# Patient Record
Sex: FEMALE | Race: OTHER | NOT HISPANIC OR LATINO
[De-identification: names, ages, dates, MRNs, and addresses within clinical notes are randomized per-mention and may not be internally consistent; named-entity substitution may affect disease eponyms.]

---

## 2021-02-03 PROBLEM — Z00.00 ENCOUNTER FOR PREVENTIVE HEALTH EXAMINATION: Status: ACTIVE | Noted: 2021-02-03

## 2021-02-04 ENCOUNTER — APPOINTMENT (OUTPATIENT)
Dept: GYNECOLOGIC ONCOLOGY | Facility: CLINIC | Age: 71
End: 2021-02-04
Payer: MEDICARE

## 2021-02-04 VITALS
WEIGHT: 138 LBS | HEART RATE: 102 BPM | DIASTOLIC BLOOD PRESSURE: 83 MMHG | HEIGHT: 62 IN | BODY MASS INDEX: 25.4 KG/M2 | SYSTOLIC BLOOD PRESSURE: 169 MMHG

## 2021-02-04 DIAGNOSIS — N95.0 POSTMENOPAUSAL BLEEDING: ICD-10-CM

## 2021-02-04 DIAGNOSIS — R93.89 ABNORMAL FINDINGS ON DIAGNOSTIC IMAGING OF OTHER SPECIFIED BODY STRUCTURES: ICD-10-CM

## 2021-02-04 DIAGNOSIS — Z78.9 OTHER SPECIFIED HEALTH STATUS: ICD-10-CM

## 2021-02-04 PROCEDURE — 58100 BIOPSY OF UTERUS LINING: CPT

## 2021-02-04 PROCEDURE — 99204 OFFICE O/P NEW MOD 45 MIN: CPT | Mod: 25

## 2021-02-04 RX ORDER — AMLODIPINE BESYLATE 5 MG/1
TABLET ORAL
Refills: 0 | Status: ACTIVE | COMMUNITY

## 2021-02-08 LAB — CORE LAB BIOPSY: NORMAL

## 2021-02-09 ENCOUNTER — APPOINTMENT (OUTPATIENT)
Dept: CT IMAGING | Facility: CLINIC | Age: 71
End: 2021-02-09
Payer: MEDICARE

## 2021-02-09 ENCOUNTER — OUTPATIENT (OUTPATIENT)
Dept: OUTPATIENT SERVICES | Facility: HOSPITAL | Age: 71
LOS: 1 days | End: 2021-02-09

## 2021-02-09 PROCEDURE — 74177 CT ABD & PELVIS W/CONTRAST: CPT | Mod: 26,ME

## 2021-02-09 PROCEDURE — G1004: CPT

## 2021-02-19 ENCOUNTER — OUTPATIENT (OUTPATIENT)
Dept: OUTPATIENT SERVICES | Facility: HOSPITAL | Age: 71
LOS: 1 days | End: 2021-02-19

## 2021-02-19 VITALS
HEART RATE: 98 BPM | HEIGHT: 59 IN | WEIGHT: 139.99 LBS | OXYGEN SATURATION: 97 % | DIASTOLIC BLOOD PRESSURE: 80 MMHG | SYSTOLIC BLOOD PRESSURE: 124 MMHG | TEMPERATURE: 98 F | RESPIRATION RATE: 16 BRPM

## 2021-02-19 DIAGNOSIS — N63.10 UNSPECIFIED LUMP IN THE RIGHT BREAST, UNSPECIFIED QUADRANT: Chronic | ICD-10-CM

## 2021-02-19 DIAGNOSIS — I10 ESSENTIAL (PRIMARY) HYPERTENSION: ICD-10-CM

## 2021-02-19 DIAGNOSIS — C54.1 MALIGNANT NEOPLASM OF ENDOMETRIUM: ICD-10-CM

## 2021-02-19 LAB
ANION GAP SERPL CALC-SCNC: 13 MMOL/L — SIGNIFICANT CHANGE UP (ref 7–14)
BLD GP AB SCN SERPL QL: NEGATIVE — SIGNIFICANT CHANGE UP
BUN SERPL-MCNC: 13 MG/DL — SIGNIFICANT CHANGE UP (ref 7–23)
CALCIUM SERPL-MCNC: 9.3 MG/DL — SIGNIFICANT CHANGE UP (ref 8.4–10.5)
CHLORIDE SERPL-SCNC: 101 MMOL/L — SIGNIFICANT CHANGE UP (ref 98–107)
CO2 SERPL-SCNC: 27 MMOL/L — SIGNIFICANT CHANGE UP (ref 22–31)
CREAT SERPL-MCNC: 0.55 MG/DL — SIGNIFICANT CHANGE UP (ref 0.5–1.3)
GLUCOSE SERPL-MCNC: 93 MG/DL — SIGNIFICANT CHANGE UP (ref 70–99)
HCT VFR BLD CALC: 42.5 % — SIGNIFICANT CHANGE UP (ref 34.5–45)
HGB BLD-MCNC: 12.9 G/DL — SIGNIFICANT CHANGE UP (ref 11.5–15.5)
MCHC RBC-ENTMCNC: 24.7 PG — LOW (ref 27–34)
MCHC RBC-ENTMCNC: 30.4 GM/DL — LOW (ref 32–36)
MCV RBC AUTO: 81.3 FL — SIGNIFICANT CHANGE UP (ref 80–100)
NRBC # BLD: 0 /100 WBCS — SIGNIFICANT CHANGE UP
NRBC # FLD: 0 K/UL — SIGNIFICANT CHANGE UP
PLATELET # BLD AUTO: 307 K/UL — SIGNIFICANT CHANGE UP (ref 150–400)
POTASSIUM SERPL-MCNC: 4.2 MMOL/L — SIGNIFICANT CHANGE UP (ref 3.5–5.3)
POTASSIUM SERPL-SCNC: 4.2 MMOL/L — SIGNIFICANT CHANGE UP (ref 3.5–5.3)
RBC # BLD: 5.23 M/UL — HIGH (ref 3.8–5.2)
RBC # FLD: 14.1 % — SIGNIFICANT CHANGE UP (ref 10.3–14.5)
RH IG SCN BLD-IMP: POSITIVE — SIGNIFICANT CHANGE UP
SODIUM SERPL-SCNC: 141 MMOL/L — SIGNIFICANT CHANGE UP (ref 135–145)
WBC # BLD: 8 K/UL — SIGNIFICANT CHANGE UP (ref 3.8–10.5)
WBC # FLD AUTO: 8 K/UL — SIGNIFICANT CHANGE UP (ref 3.8–10.5)

## 2021-02-19 RX ORDER — SODIUM CHLORIDE 9 MG/ML
3 INJECTION INTRAMUSCULAR; INTRAVENOUS; SUBCUTANEOUS EVERY 8 HOURS
Refills: 0 | Status: DISCONTINUED | OUTPATIENT
Start: 2021-03-02 | End: 2021-03-16

## 2021-02-19 RX ORDER — SODIUM CHLORIDE 9 MG/ML
1000 INJECTION, SOLUTION INTRAVENOUS
Refills: 0 | Status: DISCONTINUED | OUTPATIENT
Start: 2021-03-02 | End: 2021-03-16

## 2021-02-19 NOTE — H&P PST ADULT - NSICDXFAMILYHX_GEN_ALL_CORE_FT
FAMILY HISTORY:  Sibling  Still living? Unknown  FH: heart attack, Age at diagnosis: Age Unknown

## 2021-02-19 NOTE — H&P PST ADULT - NSICDXPASTSURGICALHX_GEN_ALL_CORE_FT
PAST SURGICAL HISTORY:  Breast mass, right excision of benign mass     PAST SURGICAL HISTORY:  Breast mass, right excision of benign mass 2019

## 2021-02-19 NOTE — H&P PST ADULT - NSICDXPROBLEM_GEN_ALL_CORE_FT
PROBLEM DIAGNOSES  Problem: Malignant neoplasm of endometrium  Assessment and Plan: Pt scheduled for surgery on 3/2/2021.  Pre-op instructions provided. Pt verbalized understanding.   Pepcid provided for GI prophylaxis.   Pt given detailed verbal and written instructions on chlorhexidine wash. Pt verbalized understanding with teachback.   Pt states she is already scheduled for preop COVID testing.   Pt went for cardiac clearance - copy in chart.     Problem: HTN (hypertension)  Assessment and Plan: Pt instructed to take her Amlodipine the morning of surgery.

## 2021-02-19 NOTE — H&P PST ADULT - HISTORY OF PRESENT ILLNESS
70 year old female with occasional postmenopausal bleeding which began about a month ago.  Pt had u/s and endometrial biopsy and presents today for presurgical evaluation for ... 70 year old female with occasional postmenopausal bleeding which began about a month ago.  Pt had u/s and endometrial biopsy with finding of malignancy. Pt presents today for presurgical evaluation for Robotic Assisted Total Laparoscopic Hysterectomy, Bilateral Salpingo Oophorectomy, [place] Lymph Node Mapping and Excision.

## 2021-03-01 ENCOUNTER — TRANSCRIPTION ENCOUNTER (OUTPATIENT)
Age: 71
End: 2021-03-01

## 2021-03-01 PROBLEM — I10 ESSENTIAL (PRIMARY) HYPERTENSION: Chronic | Status: ACTIVE | Noted: 2021-02-19

## 2021-03-02 ENCOUNTER — RESULT REVIEW (OUTPATIENT)
Age: 71
End: 2021-03-02

## 2021-03-02 ENCOUNTER — OUTPATIENT (OUTPATIENT)
Dept: OUTPATIENT SERVICES | Facility: HOSPITAL | Age: 71
LOS: 1 days | Discharge: HOME | End: 2021-03-02
Payer: MEDICARE

## 2021-03-02 ENCOUNTER — NON-APPOINTMENT (OUTPATIENT)
Age: 71
End: 2021-03-02

## 2021-03-02 ENCOUNTER — APPOINTMENT (OUTPATIENT)
Dept: GYNECOLOGIC ONCOLOGY | Facility: HOSPITAL | Age: 71
End: 2021-03-02

## 2021-03-02 VITALS
RESPIRATION RATE: 16 BRPM | TEMPERATURE: 99 F | SYSTOLIC BLOOD PRESSURE: 125 MMHG | HEIGHT: 59 IN | WEIGHT: 139.99 LBS | OXYGEN SATURATION: 99 % | DIASTOLIC BLOOD PRESSURE: 72 MMHG

## 2021-03-02 VITALS
HEART RATE: 92 BPM | SYSTOLIC BLOOD PRESSURE: 135 MMHG | DIASTOLIC BLOOD PRESSURE: 67 MMHG | OXYGEN SATURATION: 96 % | RESPIRATION RATE: 18 BRPM

## 2021-03-02 DIAGNOSIS — C54.1 MALIGNANT NEOPLASM OF ENDOMETRIUM: ICD-10-CM

## 2021-03-02 DIAGNOSIS — N63.10 UNSPECIFIED LUMP IN THE RIGHT BREAST, UNSPECIFIED QUADRANT: Chronic | ICD-10-CM

## 2021-03-02 LAB
BASOPHILS # BLD AUTO: 0.02 K/UL — SIGNIFICANT CHANGE UP (ref 0–0.2)
BASOPHILS NFR BLD AUTO: 0.1 % — SIGNIFICANT CHANGE UP (ref 0–2)
EOSINOPHIL # BLD AUTO: 0 K/UL — SIGNIFICANT CHANGE UP (ref 0–0.5)
EOSINOPHIL NFR BLD AUTO: 0 % — SIGNIFICANT CHANGE UP (ref 0–6)
HCT VFR BLD CALC: 38.5 % — SIGNIFICANT CHANGE UP (ref 34.5–45)
HGB BLD-MCNC: 11.6 G/DL — SIGNIFICANT CHANGE UP (ref 11.5–15.5)
IANC: 12.48 K/UL — HIGH (ref 1.5–8.5)
IMM GRANULOCYTES NFR BLD AUTO: 0.3 % — SIGNIFICANT CHANGE UP (ref 0–1.5)
LYMPHOCYTES # BLD AUTO: 1.24 K/UL — SIGNIFICANT CHANGE UP (ref 1–3.3)
LYMPHOCYTES # BLD AUTO: 8.7 % — LOW (ref 13–44)
MCHC RBC-ENTMCNC: 25 PG — LOW (ref 27–34)
MCHC RBC-ENTMCNC: 30.1 GM/DL — LOW (ref 32–36)
MCV RBC AUTO: 83 FL — SIGNIFICANT CHANGE UP (ref 80–100)
MONOCYTES # BLD AUTO: 0.54 K/UL — SIGNIFICANT CHANGE UP (ref 0–0.9)
MONOCYTES NFR BLD AUTO: 3.8 % — SIGNIFICANT CHANGE UP (ref 2–14)
NEUTROPHILS # BLD AUTO: 12.48 K/UL — HIGH (ref 1.8–7.4)
NEUTROPHILS NFR BLD AUTO: 87.1 % — HIGH (ref 43–77)
NRBC # BLD: 0 /100 WBCS — SIGNIFICANT CHANGE UP
NRBC # FLD: 0 K/UL — SIGNIFICANT CHANGE UP
PLATELET # BLD AUTO: 243 K/UL — SIGNIFICANT CHANGE UP (ref 150–400)
RBC # BLD: 4.64 M/UL — SIGNIFICANT CHANGE UP (ref 3.8–5.2)
RBC # FLD: 14.1 % — SIGNIFICANT CHANGE UP (ref 10.3–14.5)
RH IG SCN BLD-IMP: POSITIVE — SIGNIFICANT CHANGE UP
WBC # BLD: 14.33 K/UL — HIGH (ref 3.8–10.5)
WBC # FLD AUTO: 14.33 K/UL — HIGH (ref 3.8–10.5)

## 2021-03-02 PROCEDURE — 88307 TISSUE EXAM BY PATHOLOGIST: CPT | Mod: 26

## 2021-03-02 PROCEDURE — 38572 LAPAROSCOPY LYMPHADENECTOMY: CPT

## 2021-03-02 PROCEDURE — 88342 IMHCHEM/IMCYTCHM 1ST ANTB: CPT | Mod: 26,59

## 2021-03-02 PROCEDURE — S2900 ROBOTIC SURGICAL SYSTEM: CPT | Mod: NC

## 2021-03-02 PROCEDURE — 88341 IMHCHEM/IMCYTCHM EA ADD ANTB: CPT | Mod: 26

## 2021-03-02 PROCEDURE — 88309 TISSUE EXAM BY PATHOLOGIST: CPT | Mod: 26

## 2021-03-02 PROCEDURE — 88331 PATH CONSLTJ SURG 1 BLK 1SPC: CPT | Mod: 26

## 2021-03-02 PROCEDURE — 88112 CYTOPATH CELL ENHANCE TECH: CPT | Mod: 26

## 2021-03-02 PROCEDURE — 58571 TLH W/T/O 250 G OR LESS: CPT

## 2021-03-02 RX ORDER — ONDANSETRON 8 MG/1
4 TABLET, FILM COATED ORAL ONCE
Refills: 0 | Status: DISCONTINUED | OUTPATIENT
Start: 2021-03-02 | End: 2021-03-16

## 2021-03-02 RX ORDER — AMLODIPINE BESYLATE 2.5 MG/1
1 TABLET ORAL
Qty: 0 | Refills: 0 | DISCHARGE

## 2021-03-02 RX ORDER — OXYCODONE HYDROCHLORIDE 5 MG/1
1 TABLET ORAL
Qty: 6 | Refills: 0
Start: 2021-03-02

## 2021-03-02 RX ORDER — HYDROMORPHONE HYDROCHLORIDE 2 MG/ML
0.25 INJECTION INTRAMUSCULAR; INTRAVENOUS; SUBCUTANEOUS
Refills: 0 | Status: DISCONTINUED | OUTPATIENT
Start: 2021-03-02 | End: 2021-03-02

## 2021-03-02 RX ORDER — FENTANYL CITRATE 50 UG/ML
25 INJECTION INTRAVENOUS
Refills: 0 | Status: DISCONTINUED | OUTPATIENT
Start: 2021-03-02 | End: 2021-03-02

## 2021-03-02 RX ORDER — SODIUM CHLORIDE 9 MG/ML
1000 INJECTION, SOLUTION INTRAVENOUS
Refills: 0 | Status: DISCONTINUED | OUTPATIENT
Start: 2021-03-02 | End: 2021-03-16

## 2021-03-02 RX ORDER — ATORVASTATIN CALCIUM 80 MG/1
1 TABLET, FILM COATED ORAL
Qty: 0 | Refills: 0 | DISCHARGE

## 2021-03-02 NOTE — BRIEF OPERATIVE NOTE - OPERATION/FINDINGS
EUA: small mobile uterus, no adnexal masses palpable b/l, cervix and vagina grossly wnl; uterus sounded to 8.5cm. ICG injected at 3 and 9 o'clock.  LSC: grossly normal uterus, bilateral fallopian tubes, bilateral ovaries.  grossly normal appendix, small bowel, colon, omentum, peritoneum, bladder.  Bilateral vermiculation of ureters noted throughout case.  Excellent mapping of bilateral pelvic lymph nodes and para-aortic lymph node.

## 2021-03-02 NOTE — ASU DISCHARGE PLAN (ADULT/PEDIATRIC) - ASU DC SPECIAL INSTRUCTIONSFT
Return to your regular way of eating.  Resume normal activity as tolerated, but no heavy lifting or strenuous activity for 2 weeks.  No driving for next 2 weeks and/or while on narcotic pain medication.  Complete vaginal rest, no tampons, no douching, no tub bathing, no sexual activities for 2 weeks unless otherwise instructed by your doctor.  Call your doctor with any signs and symptoms of infection such as fever, chills, nausea or vomiting.  Call your doctor with redness or swelling at the incision site, fluid leakage or wound separation.  Call your doctor if you're unable to tolerate food or have difficulty urinating.  Call your doctor if you have pain that is not relieved by your prescribed medications.  Notify your doctor with any other concerns.  Follow up with Dr. Hein on March 22, 2021 at 3PM for your postoperative appointment.

## 2021-03-02 NOTE — BRIEF OPERATIVE NOTE - NSICDXBRIEFPROCEDURE_GEN_ALL_CORE_FT
PROCEDURES:  Robot-assisted laparoscopic total hysterectomy with salpingo-oophorectomy and lymph node dissection for staging of neoplasm 02-Mar-2021 11:07:01  Toya Saez

## 2021-03-02 NOTE — ASU DISCHARGE PLAN (ADULT/PEDIATRIC) - CALL YOUR DOCTOR IF YOU HAVE ANY OF THE FOLLOWING:
Bleeding that does not stop/Pain not relieved by Medications/Fever greater than (need to indicate Fahrenheit or Celsius)/Nausea and vomiting that does not stop Bleeding that does not stop/Swelling that gets worse/Pain not relieved by Medications/Fever greater than (need to indicate Fahrenheit or Celsius)/Wound/Surgical Site with redness, or foul smelling discharge or pus/Nausea and vomiting that does not stop/Unable to urinate

## 2021-03-02 NOTE — ASU DISCHARGE PLAN (ADULT/PEDIATRIC) - PROCEDURE
Robotic Assisted Total Laparoscopic Hysterectomy, Bilateral Salpingo-oophorectomy, [place] Lymph Node Mapping and Dissection Robotic Assisted Total Laparoscopic Hysterectomy, Bilateral Salpingo-oophorectomy, [place] Lymph Node Mapping and Dissection

## 2021-03-02 NOTE — PROGRESS NOTE ADULT - ASSESSMENT
70yoF s/p Robot-assisted laparoscopic total hysterectomy with salpingo-oophorectomy and lymph node dissection for staging of neoplasm ([place] = endometroid adenocarcinoma with >50% invasion) in stable condition  -discharge home  -f/u with Dr. Hein in 2 weeks  -gyn/onc team updated    Rambo Lee PAC  #[id number]

## 2021-03-02 NOTE — ASU DISCHARGE PLAN (ADULT/PEDIATRIC) - NURSING INSTRUCTIONS
Please report any signs and symptoms of infection including Fever (Temp >101 or >100.4 if GYN procedure), uncontrollable nausea, vomiting, diarrhea, chills & inability to urinate. Shower with soap & water when appropriate, pat dry with clean towel & no ointments, creams, powders or lotions on incisions unless okayed by MD. Please report any puss or increased drainage from incision sites, or if redness develops and spreads around sites. Please practice good hand hygiene especially after using the bathroom. Follow up with all MD appointments and take medication(s) as prescribed  Nothing in vagina, no intercourse, no douching, no tampons, no tub baths, and no swimming for 2 weeks or until cleared by M.D.  DO NOT take any Tylenol (Acetaminophen) or narcotics containing Tylenol until after  3pm 3/2/21 . You received Tylenol during your operation and it can cause damage to your liver if too much is taken within a 24 hour time period.

## 2021-03-02 NOTE — ASU DISCHARGE PLAN (ADULT/PEDIATRIC) - FOLLOW UP APPOINTMENTS
may also call Recovery Room (PACU) 24/7 @ [phone]/[facility]

## 2021-03-02 NOTE — PROGRESS NOTE ADULT - SUBJECTIVE AND OBJECTIVE BOX
GYNECOLOGIC ONCOLOGY PA POST OP NOTE    Pt seen and examined at bedside. Pt is without complaints. Pain well controlled. Pt denies headache, dizziness, nausea, vomiting, chest pain and sob. Pt ambulated to bathroom, voided 200cc. She had tea and crackers to eat.    OBJECTIVE:     VITALS:  T(F): 98.1 (03-02-21 @ 14:05), Max: 99 (03-02-21 @ 06:11)  HR: 92 (03-02-21 @ 14:21) (74 - 96)  BP: 135/67 (03-02-21 @ 14:21) (106/63 - 135/67)  RR: 18 (03-02-21 @ 14:21) (14 - 18)  SpO2: 96% (03-02-21 @ 14:21) (94% - 100%)  Wt(kg): --    I&O's Summary    02 Mar 2021 07:01  -  02 Mar 2021 14:22  --------------------------------------------------------  IN: 632 mL / OUT: 450 mL / NET: 182 mL        MEDICATIONS  (STANDING):  lactated ringers. 1000 milliLiter(s) (30 mL/Hr) IV Continuous <Continuous>  lactated ringers. 1000 milliLiter(s) (125 mL/Hr) IV Continuous <Continuous>  sodium chloride 0.9% lock flush 3 milliLiter(s) IV Push every 8 hours    MEDICATIONS  (PRN):  fentaNYL    Injectable 25 MICROGram(s) IV Push every 5 minutes PRN Moderate Pain (4 - 6)  HYDROmorphone  Injectable 0.25 milliGRAM(s) IV Push every 10 minutes PRN Moderate Pain (4 - 6)  ondansetron Injectable 4 milliGRAM(s) IV Push once PRN Nausea and/or Vomiting      Physical Exam:  Constitutional: NAD  Pulmonary: clear to auscultation bilaterally   Cardiovascular: Regular rate and rhythm   Abdomen: soft, non-distended, appropriate tenderness, scope sites C/D/I  Extremities: no lower extremity edema or calve tenderness    LABS:                        11.6   14.33 )-----------( 243      ( 02 Mar 2021 13:11 )             38.5

## 2021-03-02 NOTE — ASU DISCHARGE PLAN (ADULT/PEDIATRIC) - CARE PROVIDER_API CALL
Bonnie Hein)  Gynecologic Oncology; Obstetrics and Gynecology  [address] [place] [address]  Phone: [phone]  Fax: [phone]  Established Patient  Follow Up Time:

## 2021-03-02 NOTE — ASU DISCHARGE PLAN (ADULT/PEDIATRIC) - ACTIVITY LEVEL
No heavy lifting/Weight bearing as tolerated/Nothing per rectum/Nothing per vagina/No tub baths/No douching/No tampons/No intercourse

## 2021-03-02 NOTE — BRIEF OPERATIVE NOTE - SPECIMENS
uterus with cervix, bilateral tubes and bilateral ovaries. left sentinal pelvic lymph node.  right sentinal pelvic lymph node.  right sentinal obturator pelvic lymph node.  para-aortic sentinal pelvic lymph node.

## 2021-03-08 ENCOUNTER — NON-APPOINTMENT (OUTPATIENT)
Age: 71
End: 2021-03-08

## 2021-03-08 LAB
NON-GYNECOLOGICAL CYTOLOGY STUDY: SIGNIFICANT CHANGE UP
SURGICAL PATHOLOGY STUDY: SIGNIFICANT CHANGE UP

## 2021-03-17 ENCOUNTER — NON-APPOINTMENT (OUTPATIENT)
Age: 71
End: 2021-03-17

## 2021-03-18 ENCOUNTER — APPOINTMENT (OUTPATIENT)
Dept: GYNECOLOGIC ONCOLOGY | Facility: CLINIC | Age: 71
End: 2021-03-18
Payer: MEDICARE

## 2021-03-18 VITALS
SYSTOLIC BLOOD PRESSURE: 171 MMHG | TEMPERATURE: 97.2 F | WEIGHT: 141.38 LBS | HEIGHT: 62 IN | DIASTOLIC BLOOD PRESSURE: 82 MMHG | BODY MASS INDEX: 26.02 KG/M2 | HEART RATE: 84 BPM

## 2021-03-18 PROCEDURE — 99212 OFFICE O/P EST SF 10 MIN: CPT | Mod: 24

## 2021-03-23 ENCOUNTER — RESULT REVIEW (OUTPATIENT)
Age: 71
End: 2021-03-23

## 2021-03-23 ENCOUNTER — APPOINTMENT (OUTPATIENT)
Dept: MAMMOGRAPHY | Facility: CLINIC | Age: 71
End: 2021-03-23
Payer: MEDICARE

## 2021-03-23 ENCOUNTER — OUTPATIENT (OUTPATIENT)
Dept: OUTPATIENT SERVICES | Facility: HOSPITAL | Age: 71
LOS: 1 days | End: 2021-03-23

## 2021-03-23 DIAGNOSIS — N63.10 UNSPECIFIED LUMP IN THE RIGHT BREAST, UNSPECIFIED QUADRANT: Chronic | ICD-10-CM

## 2021-03-23 PROCEDURE — 77063 BREAST TOMOSYNTHESIS BI: CPT | Mod: 26

## 2021-03-23 PROCEDURE — 77067 SCR MAMMO BI INCL CAD: CPT | Mod: 26

## 2021-03-25 ENCOUNTER — APPOINTMENT (OUTPATIENT)
Dept: RADIATION ONCOLOGY | Facility: CLINIC | Age: 71
End: 2021-03-25
Payer: MEDICARE

## 2021-03-25 VITALS
HEART RATE: 80 BPM | SYSTOLIC BLOOD PRESSURE: 144 MMHG | DIASTOLIC BLOOD PRESSURE: 80 MMHG | RESPIRATION RATE: 17 BRPM | WEIGHT: 142 LBS | OXYGEN SATURATION: 98 % | TEMPERATURE: 98.3 F | BODY MASS INDEX: 25.97 KG/M2

## 2021-03-25 DIAGNOSIS — Z78.9 OTHER SPECIFIED HEALTH STATUS: ICD-10-CM

## 2021-03-25 DIAGNOSIS — I10 ESSENTIAL (PRIMARY) HYPERTENSION: ICD-10-CM

## 2021-03-25 PROCEDURE — 99204 OFFICE O/P NEW MOD 45 MIN: CPT | Mod: 25

## 2021-03-25 RX ORDER — ATORVASTATIN CALCIUM 10 MG/1
10 TABLET, FILM COATED ORAL
Refills: 0 | Status: ACTIVE | COMMUNITY

## 2021-03-25 NOTE — HISTORY OF PRESENT ILLNESS
[FreeTextEntry1] : Ms. Enedina Robertson is a 70 year old female who presents for consideration of radiation therapy for a FIGO stage 1B grade 1 endometrioid adenocarcinoma with LVSI and two pelvic nodes with isolated tumor cells. She is s/p hysterectomy and BSO on 3/2/21.\par \par Patient noted PMB, described as intermittent vaginal spotting for three weeks, as well as cramping. \par \par Pap done 1/20/21 was negative for intraepithelial lesion or malignancy, HPV not detected. \par \par Pelvic ultrasound done 1/21/21 revealed the following: \par -Thickened endometrium up to 1.6 cm. Intracavitary nodularity measures 1 x 0.8 x 2 cm.\par -Posterior intramural fibroid measures 0.8 x 0.5 x 0.8 cm. \par \par EMB done 2/4/21 showed the following: \par -Endometrioid adenocarcinoma, well differentiated, FIGO 1.\par \par CT Abdomen and Pelvis done 2/9/21 showed the following: \par -Right fundal submucosal/ endometrial lesion measuring 2.8 x 2 cm, unclear if fibroid or neoplasm.\par -Borderline enlarged indeterminate right obturator lymph node measuring 1.1 cm. No metastatic disease.\par \par On 3/2/21, she underwent robotic-assisted total laparoscopic hysterectomy, bilateral salpingo-oophorectomy, and sentinel lymph node mapping with pelvic and paraaortic lymph node biopsy. Pathology revealed the following: \par 1. Left pelvic sentinel lymph node: 0/1.\par 2. Right pelvic sentinel lymph node: One lymph node with isolated rare cytokeratin positive tumor cells.\par 3. Right obturator sentinel lymph node: One lymph node with isolated cytokeratin positive tumor cells.\par 4. Uterus, cervix, bilateral fallopian tubes and ovaries, hysterectomy and salpingo-oophorectomy: Well differentiated endometrioid adenocarcinoma, FIGO 1, infiltrating into the outer half of the myometrium (MELF type pattern of invasion). Depth of invasion 12 mm,\par Myometrial thickness 17 mm. Benign cervix. Benign ovaries, bilateral. Benign fallopian tubes, bilateral. Adenomyosis present, involved by carcinoma. LVSI identified. \par 5. Left sentinel para-aortic lymph node: 0/1. \par Pathologic Stage Classification pTNM, AJCC 8th Edition: pT1b, pN0 (i+)\par -Pelvic wash: Atypical findings.  Cytology slide shows rare clusters of mildly atypical cells with prominent nucleoli and vacuolated cytoplasm in a background of scattered lymphocytes, histiocytes and collagen balls. Reactive mesothelial cells cannot be ruled out.\par -IHC for MMR: No loss of nuclear expression of MMR proteins (MLH1, MSH2, MSH6, and PMS2).  These results show low probability\par of microsatellite instability-high (MSI-H).\par \par She followed up with Dr. Hein on 3/18/21, at which time they discussed gyn onc tumor board consensus for pelvic EBRT. \par \par Today, she notes she feels well physically although she is nervous about her diagnosis and treatment. She denies NVDC, abdominal pain, dysuria, vaginal bleeding or discharge, fever, chills, or any other complaints. \par She denies history of radiation.\par \par Denies family history of cancer, although her  is currently undergoing RT for prostate cancer. \par \par Patient is a non-smoker, drinks alcohol socially. She lives in [place] with her . She is a retired ICU nurse.

## 2021-03-25 NOTE — PHYSICAL EXAM
[] : no respiratory distress [Respiration, Rhythm And Depth] : normal respiratory rhythm and effort [Auscultation Breath Sounds / Voice Sounds] : lungs were clear to auscultation bilaterally [Heart Rate And Rhythm] : heart rate and rhythm were normal [Heart Sounds] : normal S1 and S2 [Bowel Sounds] : normal bowel sounds [Abdomen Soft] : soft [Nondistended] : nondistended [Abdomen Tenderness] : non-tender [Normal] : oriented to person, place and time, the affect was normal, the mood was normal and not anxious [de-identified] : declined pelvic examination [de-identified] : well healing laparoscopy incisions, no erythema noted.

## 2021-03-25 NOTE — REVIEW OF SYSTEMS
[Patient Intake Form Reviewed] : Patient intake form was reviewed [Negative] : Allergic/Immunologic [Abdominal Pain] : no abdominal pain [Vomiting] : no vomiting [Constipation] : no constipation [Diarrhea] : no diarrhea [de-identified] : s/p laparoscopic hysterectomy

## 2021-03-25 NOTE — VITALS
[Maximal Pain Intensity: 0/10] : 0/10 [Least Pain Intensity: 0/10] : 0/10 [90: Able to carry normal activity; minor signs or symptoms of disease.] : 90: Able to carry normal activity; minor signs or symptoms of disease.  [Date: ____________] : Patient's last distress assessment performed on [unfilled]. [3 - Distress Level] : Distress Level: 3

## 2021-04-21 ENCOUNTER — NON-APPOINTMENT (OUTPATIENT)
Age: 71
End: 2021-04-21

## 2021-04-21 VITALS
HEART RATE: 72 BPM | BODY MASS INDEX: 25.79 KG/M2 | OXYGEN SATURATION: 99 % | RESPIRATION RATE: 16 BRPM | WEIGHT: 141 LBS | DIASTOLIC BLOOD PRESSURE: 76 MMHG | TEMPERATURE: 98.6 F | SYSTOLIC BLOOD PRESSURE: 138 MMHG

## 2021-04-21 NOTE — PHYSICAL EXAM
[Sclera] : the sclera and conjunctiva were normal [Extraocular Movements] : extraocular movements were intact [Outer Ear] : the ears and nose were normal in appearance [Hearing Threshold Finger Rub Not [place]] : hearing was normal [] : no respiratory distress [Respiration, Rhythm And Depth] : normal respiratory rhythm and effort [Heart Rate And Rhythm] : heart rate and rhythm were normal [Abdomen Soft] : soft [Nondistended] : nondistended [Abdomen Tenderness] : non-tender [Normal] : oriented to person, place and time, the affect was normal, the mood was normal and not anxious

## 2021-04-22 ENCOUNTER — APPOINTMENT (OUTPATIENT)
Dept: GYNECOLOGIC ONCOLOGY | Facility: CLINIC | Age: 71
End: 2021-04-22
Payer: MEDICARE

## 2021-04-22 VITALS
HEART RATE: 94 BPM | SYSTOLIC BLOOD PRESSURE: 166 MMHG | DIASTOLIC BLOOD PRESSURE: 84 MMHG | WEIGHT: 140 LBS | BODY MASS INDEX: 25.76 KG/M2 | HEIGHT: 62 IN | TEMPERATURE: 97.9 F

## 2021-04-22 PROCEDURE — 99024 POSTOP FOLLOW-UP VISIT: CPT

## 2021-04-22 NOTE — REVIEW OF SYSTEMS
[Patient Intake Form Reviewed] : Patient intake form was reviewed [Negative] : Allergic/Immunologic [Constipation: Grade 0] : Constipation: Grade 0 [Diarrhea: Grade 0] : Diarrhea: Grade 0 [Fatigue: Grade 0] : Fatigue: Grade 0 [Hematuria: Grade 0] : Hematuria: Grade 0 [Urinary Tract Pain: Grade 0] : Urinary Tract Pain: Grade 0 [Abdominal Pain] : no abdominal pain [Vomiting] : no vomiting [Constipation] : no constipation [Diarrhea] : no diarrhea [de-identified] : s/p laparoscopic hysterectomy

## 2021-04-22 NOTE — HISTORY OF PRESENT ILLNESS
[FreeTextEntry1] : 4/21/21- OTV- Ms. Robertson has completed 900 cGy/ 4500 cGy to the pelvis.  Today, she reports she feels good and treatment is going well thus far. She denies GI complaints to include abdominal pain or diarrhea. Denies dysuria, vaginal bleeding or discharge.\par \par ___________________\par INITIAL EVAL (3/25/21)\par Ms. Enedina Robertson is a 70 year old female who presents for consideration of radiation therapy for a FIGO stage 1B grade 1 endometrioid adenocarcinoma with LVSI and two pelvic nodes with isolated tumor cells. She is s/p hysterectomy and BSO on 3/2/21.\par \par Patient noted PMB, described as intermittent vaginal spotting for three weeks, as well as cramping. \par \par Pap done 1/20/21 was negative for intraepithelial lesion or malignancy, HPV not detected. \par \par Pelvic ultrasound done 1/21/21 revealed the following: \par -Thickened endometrium up to 1.6 cm. Intracavitary nodularity measures 1 x 0.8 x 2 cm.\par -Posterior intramural fibroid measures 0.8 x 0.5 x 0.8 cm. \par \par EMB done 2/4/21 showed the following: \par -Endometrioid adenocarcinoma, well differentiated, FIGO 1.\par \par CT Abdomen and Pelvis done 2/9/21 showed the following: \par -Right fundal submucosal/ endometrial lesion measuring 2.8 x 2 cm, unclear if fibroid or neoplasm.\par -Borderline enlarged indeterminate right obturator lymph node measuring 1.1 cm. No metastatic disease.\par \par On 3/2/21, she underwent robotic-assisted total laparoscopic hysterectomy, bilateral salpingo-oophorectomy, and sentinel lymph node mapping with pelvic and paraaortic lymph node biopsy. Pathology revealed the following: \par 1. Left pelvic sentinel lymph node: 0/1.\par 2. Right pelvic sentinel lymph node: One lymph node with isolated rare cytokeratin positive tumor cells.\par 3. Right obturator sentinel lymph node: One lymph node with isolated cytokeratin positive tumor cells.\par 4. Uterus, cervix, bilateral fallopian tubes and ovaries, hysterectomy and salpingo-oophorectomy: Well differentiated endometrioid adenocarcinoma, FIGO 1, infiltrating into the outer half of the myometrium (MELF type pattern of invasion). Depth of invasion 12 mm,\par Myometrial thickness 17 mm. Benign cervix. Benign ovaries, bilateral. Benign fallopian tubes, bilateral. Adenomyosis present, involved by carcinoma. LVSI identified. \par 5. Left sentinel para-aortic lymph node: 0/1. \par Pathologic Stage Classification pTNM, AJCC 8th Edition: pT1b, pN0 (i+)\par -Pelvic wash: Atypical findings.  Cytology slide shows rare clusters of mildly atypical cells with prominent nucleoli and vacuolated cytoplasm in a background of scattered lymphocytes, histiocytes and collagen balls. Reactive mesothelial cells cannot be ruled out.\par -IHC for MMR: No loss of nuclear expression of MMR proteins (MLH1, MSH2, MSH6, and PMS2).  These results show low probability\par of microsatellite instability-high (MSI-H).\par \par She followed up with Dr. Hein on 3/18/21, at which time they discussed gyn onc tumor board consensus for pelvic EBRT. \par \par Today, she notes she feels well physically although she is nervous about her diagnosis and treatment. She denies NVDC, abdominal pain, dysuria, vaginal bleeding or discharge, fever, chills, or any other complaints. \par She denies history of radiation.\par \par Denies family history of cancer, although her  is currently undergoing RT for prostate cancer. \par \par Patient is a non-smoker, drinks alcohol socially. She lives in [place] with her . She is a retired ICU nurse.

## 2021-04-22 NOTE — DISEASE MANAGEMENT
[Pathological] : TNM Stage: p [IB] : IB [TTNM] : 1b [NTNM] : 0(i+) [MTNM] : 0 [de-identified] : 4500 cGy [de-identified] : 900 cGy [de-identified] : Pelvis

## 2021-04-27 NOTE — PHYSICAL EXAM
[Sclera] : the sclera and conjunctiva were normal [Extraocular Movements] : extraocular movements were intact [Outer Ear] : the ears and nose were normal in appearance [Hearing Threshold Finger Rub Not [place]] : hearing was normal [Respiration, Rhythm And Depth] : normal respiratory rhythm and effort [] : no respiratory distress [Heart Rate And Rhythm] : heart rate and rhythm were normal [Abdomen Soft] : soft [Nondistended] : nondistended [Abdomen Tenderness] : non-tender [Normal] : oriented to person, place and time, the affect was normal, the mood was normal and not anxious

## 2021-04-28 ENCOUNTER — NON-APPOINTMENT (OUTPATIENT)
Age: 71
End: 2021-04-28

## 2021-04-29 VITALS
BODY MASS INDEX: 25.79 KG/M2 | TEMPERATURE: 97.2 F | RESPIRATION RATE: 18 BRPM | OXYGEN SATURATION: 98 % | SYSTOLIC BLOOD PRESSURE: 147 MMHG | DIASTOLIC BLOOD PRESSURE: 80 MMHG | HEART RATE: 70 BPM | WEIGHT: 141 LBS

## 2021-05-05 ENCOUNTER — NON-APPOINTMENT (OUTPATIENT)
Age: 71
End: 2021-05-05

## 2021-05-05 VITALS
DIASTOLIC BLOOD PRESSURE: 86 MMHG | RESPIRATION RATE: 18 BRPM | SYSTOLIC BLOOD PRESSURE: 146 MMHG | HEART RATE: 65 BPM | OXYGEN SATURATION: 98 %

## 2021-05-05 NOTE — VITALS
[Maximal Pain Intensity: 1/10] : 1/10 [Pain Description/Quality: ___] : Pain description/quality: [unfilled] [NoTreatment Scheduled] : no treatment scheduled [90: Able to carry normal activity; minor signs or symptoms of disease.] : 90: Able to carry normal activity; minor signs or symptoms of disease.

## 2021-05-12 ENCOUNTER — NON-APPOINTMENT (OUTPATIENT)
Age: 71
End: 2021-05-12

## 2021-05-12 VITALS
HEART RATE: 70 BPM | SYSTOLIC BLOOD PRESSURE: 144 MMHG | OXYGEN SATURATION: 98 % | DIASTOLIC BLOOD PRESSURE: 80 MMHG | TEMPERATURE: 98.2 F | RESPIRATION RATE: 17 BRPM | BODY MASS INDEX: 25.97 KG/M2 | WEIGHT: 142 LBS

## 2021-05-12 NOTE — REVIEW OF SYSTEMS
[Hematuria: Grade 0] : Hematuria: Grade 0 [Urinary Tract Pain: Grade 0] : Urinary Tract Pain: Grade 0 [Patient Intake Form Reviewed] : Patient intake form was reviewed [Negative] : Allergic/Immunologic [Diarrhea: Grade 0] : Diarrhea: Grade 0 [Fatigue: Grade 1 - Fatigue relieved by rest] : Fatigue: Grade 1 - Fatigue relieved by rest [Urinary Incontinence: Grade 0] : Urinary Incontinence: Grade 0  [Urinary Urgency: Grade 0] : Urinary Urgency: Grade 0 [Abdominal Pain] : no abdominal pain [Vomiting] : no vomiting [Constipation] : no constipation [Diarrhea] : no diarrhea [de-identified] : s/p laparoscopic hysterectomy

## 2021-05-12 NOTE — DISEASE MANAGEMENT
[Pathological] : TNM Stage: p [IB] : IB [TTNM] : 1b [NTNM] : 0(i+) [MTNM] : 0 [de-identified] : 1620 cGy [de-identified] : 4500 cGy [de-identified] : Pelvis

## 2021-05-12 NOTE — REVIEW OF SYSTEMS
[Constipation: Grade 0] : Constipation: Grade 0 [Fatigue: Grade 0] : Fatigue: Grade 0 [Hematuria: Grade 0] : Hematuria: Grade 0 [Urinary Tract Pain: Grade 0] : Urinary Tract Pain: Grade 0 [Patient Intake Form Reviewed] : Patient intake form was reviewed [Negative] : Allergic/Immunologic [Abdominal Pain] : no abdominal pain [Vomiting] : no vomiting [Constipation] : no constipation [Diarrhea] : no diarrhea [de-identified] : s/p laparoscopic hysterectomy

## 2021-05-12 NOTE — HISTORY OF PRESENT ILLNESS
[FreeTextEntry1] : 4/28/21- OTV- Ms. Robertson has completed 1620 cGy/ 4500 cGy to the pelvis.  Today, she notes she feels well with the exception of gas at times. Also reports an episode of loose stool yesterday afternoon, but no further episodes today and no blood in the stool. Denies dysuria, vaginal bleeding or discharge. Reports good appetite and energy. Using Eucerin daily.\par \par 4/21/21- OTV- Ms. Robertson has completed 900 cGy/ 4500 cGy to the pelvis.  Today, she reports she feels good and treatment is going well thus far. She denies GI complaints to include abdominal pain or diarrhea. Denies dysuria, vaginal bleeding or discharge.\par \par ___________________\par INITIAL EVAL (3/25/21)\par Ms. Enedina Robertson is a 70 year old female who presents for consideration of radiation therapy for a FIGO stage 1B grade 1 endometrioid adenocarcinoma with LVSI and two pelvic nodes with isolated tumor cells. She is s/p hysterectomy and BSO on 3/2/21.\par \par Patient noted PMB, described as intermittent vaginal spotting for three weeks, as well as cramping. \par \par Pap done 1/20/21 was negative for intraepithelial lesion or malignancy, HPV not detected. \par \par Pelvic ultrasound done 1/21/21 revealed the following: \par -Thickened endometrium up to 1.6 cm. Intracavitary nodularity measures 1 x 0.8 x 2 cm.\par -Posterior intramural fibroid measures 0.8 x 0.5 x 0.8 cm. \par \par EMB done 2/4/21 showed the following: \par -Endometrioid adenocarcinoma, well differentiated, FIGO 1.\par \par CT Abdomen and Pelvis done 2/9/21 showed the following: \par -Right fundal submucosal/ endometrial lesion measuring 2.8 x 2 cm, unclear if fibroid or neoplasm.\par -Borderline enlarged indeterminate right obturator lymph node measuring 1.1 cm. No metastatic disease.\par \par On 3/2/21, she underwent robotic-assisted total laparoscopic hysterectomy, bilateral salpingo-oophorectomy, and sentinel lymph node mapping with pelvic and paraaortic lymph node biopsy. Pathology revealed the following: \par 1. Left pelvic sentinel lymph node: 0/1.\par 2. Right pelvic sentinel lymph node: One lymph node with isolated rare cytokeratin positive tumor cells.\par 3. Right obturator sentinel lymph node: One lymph node with isolated cytokeratin positive tumor cells.\par 4. Uterus, cervix, bilateral fallopian tubes and ovaries, hysterectomy and salpingo-oophorectomy: Well differentiated endometrioid adenocarcinoma, FIGO 1, infiltrating into the outer half of the myometrium (MELF type pattern of invasion). Depth of invasion 12 mm,\par Myometrial thickness 17 mm. Benign cervix. Benign ovaries, bilateral. Benign fallopian tubes, bilateral. Adenomyosis present, involved by carcinoma. LVSI identified. \par 5. Left sentinel para-aortic lymph node: 0/1. \par Pathologic Stage Classification pTNM, AJCC 8th Edition: pT1b, pN0 (i+)\par -Pelvic wash: Atypical findings.  Cytology slide shows rare clusters of mildly atypical cells with prominent nucleoli and vacuolated cytoplasm in a background of scattered lymphocytes, histiocytes and collagen balls. Reactive mesothelial cells cannot be ruled out.\par -IHC for MMR: No loss of nuclear expression of MMR proteins (MLH1, MSH2, MSH6, and PMS2).  These results show low probability\par of microsatellite instability-high (MSI-H).\par \par She followed up with Dr. Hein on 3/18/21, at which time they discussed gyn onc tumor board consensus for pelvic EBRT. \par \par Today, she notes she feels well physically although she is nervous about her diagnosis and treatment. She denies NVDC, abdominal pain, dysuria, vaginal bleeding or discharge, fever, chills, or any other complaints. \par She denies history of radiation.\par \par Denies family history of cancer, although her  is currently undergoing RT for prostate cancer. \par \par Patient is a non-smoker, drinks alcohol socially. She lives in [place] with her . She is a retired ICU nurse.

## 2021-05-12 NOTE — DISEASE MANAGEMENT
[Pathological] : TNM Stage: p [IB] : IB [TTNM] : 1b [NTNM] : 0(i+) [MTNM] : 0 [de-identified] : 2340 cGy [de-identified] : 4500 cGy [de-identified] : Pelvis

## 2021-05-12 NOTE — HISTORY OF PRESENT ILLNESS
[FreeTextEntry1] : 5/4/21 OTV - Ms Robertson presents today for weekly OTV, ater completing 13 of 26 fractions for 2340 cGy of 4500 cGY. . Patient states she is starting to feel increased fatigue and needed to nap as of yesterday. She said that fatigue was able to be relieved by rest. Patient states she has some intermittent burning feeling but that is goes away on its own without treatment. Denies hematuria or vaginal bleeding or discharge. \par \par 4/28/21- OTV- Ms. Robertson has completed 1620 cGy/ 4500 cGy to the pelvis.  Today, she notes she feels well with the exception of gas at times. Also reports an episode of loose stool yesterday afternoon, but no further episodes today and no blood in the stool. Denies dysuria, vaginal bleeding or discharge. Reports good appetite and energy. Using Eucerin daily.\par \par 4/21/21- OTV- Ms. Robertson has completed 900 cGy/ 4500 cGy to the pelvis.  Today, she reports she feels good and treatment is going well thus far. She denies GI complaints to include abdominal pain or diarrhea. Denies dysuria, vaginal bleeding or discharge.\par \par ___________________\par INITIAL EVAL (3/25/21)\par Ms. Enedina Robertson is a 70 year old female who presents for consideration of radiation therapy for a FIGO stage 1B grade 1 endometrioid adenocarcinoma with LVSI and two pelvic nodes with isolated tumor cells. She is s/p hysterectomy and BSO on 3/2/21.\par \par Patient noted PMB, described as intermittent vaginal spotting for three weeks, as well as cramping. \par \par Pap done 1/20/21 was negative for intraepithelial lesion or malignancy, HPV not detected. \par \par Pelvic ultrasound done 1/21/21 revealed the following: \par -Thickened endometrium up to 1.6 cm. Intracavitary nodularity measures 1 x 0.8 x 2 cm.\par -Posterior intramural fibroid measures 0.8 x 0.5 x 0.8 cm. \par \par EMB done 2/4/21 showed the following: \par -Endometrioid adenocarcinoma, well differentiated, FIGO 1.\par \par CT Abdomen and Pelvis done 2/9/21 showed the following: \par -Right fundal submucosal/ endometrial lesion measuring 2.8 x 2 cm, unclear if fibroid or neoplasm.\par -Borderline enlarged indeterminate right obturator lymph node measuring 1.1 cm. No metastatic disease.\par \par On 3/2/21, she underwent robotic-assisted total laparoscopic hysterectomy, bilateral salpingo-oophorectomy, and sentinel lymph node mapping with pelvic and paraaortic lymph node biopsy. Pathology revealed the following: \par 1. Left pelvic sentinel lymph node: 0/1.\par 2. Right pelvic sentinel lymph node: One lymph node with isolated rare cytokeratin positive tumor cells.\par 3. Right obturator sentinel lymph node: One lymph node with isolated cytokeratin positive tumor cells.\par 4. Uterus, cervix, bilateral fallopian tubes and ovaries, hysterectomy and salpingo-oophorectomy: Well differentiated endometrioid adenocarcinoma, FIGO 1, infiltrating into the outer half of the myometrium (MELF type pattern of invasion). Depth of invasion 12 mm,\par Myometrial thickness 17 mm. Benign cervix. Benign ovaries, bilateral. Benign fallopian tubes, bilateral. Adenomyosis present, involved by carcinoma. LVSI identified. \par 5. Left sentinel para-aortic lymph node: 0/1. \par Pathologic Stage Classification pTNM, AJCC 8th Edition: pT1b, pN0 (i+)\par -Pelvic wash: Atypical findings.  Cytology slide shows rare clusters of mildly atypical cells with prominent nucleoli and vacuolated cytoplasm in a background of scattered lymphocytes, histiocytes and collagen balls. Reactive mesothelial cells cannot be ruled out.\par -IHC for MMR: No loss of nuclear expression of MMR proteins (MLH1, MSH2, MSH6, and PMS2).  These results show low probability\par of microsatellite instability-high (MSI-H).\par \par She followed up with Dr. Hein on 3/18/21, at which time they discussed gyn onc tumor board consensus for pelvic EBRT. \par \par Today, she notes she feels well physically although she is nervous about her diagnosis and treatment. She denies NVDC, abdominal pain, dysuria, vaginal bleeding or discharge, fever, chills, or any other complaints. \par She denies history of radiation.\par \par Denies family history of cancer, although her  is currently undergoing RT for prostate cancer. \par \par Patient is a non-smoker, drinks alcohol socially. She lives in [place] with her . She is a retired ICU nurse.

## 2021-05-19 ENCOUNTER — NON-APPOINTMENT (OUTPATIENT)
Age: 71
End: 2021-05-19

## 2021-05-19 VITALS
SYSTOLIC BLOOD PRESSURE: 123 MMHG | DIASTOLIC BLOOD PRESSURE: 77 MMHG | RESPIRATION RATE: 19 BRPM | HEART RATE: 79 BPM | OXYGEN SATURATION: 97 %

## 2021-05-19 NOTE — REVIEW OF SYSTEMS
[Constipation: Grade 0] : Constipation: Grade 0 [Hematuria: Grade 0] : Hematuria: Grade 0 [Patient Intake Form Reviewed] : Patient intake form was reviewed [Negative] : Allergic/Immunologic [Diarrhea: Grade 0] : Diarrhea: Grade 0 [Urinary Tract Pain: Grade 1 - Mild pain] : Urinary Tract Pain: Grade 1 - Mild pain [Skin Hyperpigmentation: Grade 1 - Hyperpigmentation covering <10% BSA; no psychosocial impact] : Skin Hyperpigmentation: Grade 1 - Hyperpigmentation covering <10% BSA; no psychosocial impact [Abdominal Pain] : no abdominal pain [Vomiting] : no vomiting [Constipation] : no constipation [Diarrhea] : no diarrhea [de-identified] : s/p laparoscopic hysterectomy

## 2021-05-19 NOTE — DISEASE MANAGEMENT
[Pathological] : TNM Stage: p [IB] : IB [TTNM] : 1b [NTNM] : 0(i+) [MTNM] : 0 [de-identified] : 9610 cGy [de-identified] : 4500 cGy [de-identified] : Pelvis

## 2021-05-19 NOTE — DISEASE MANAGEMENT
[Pathological] : TNM Stage: p [IB] : IB [TTNM] : 1b [NTNM] : 0(i+) [MTNM] : 0 [de-identified] : 3240 cGy [de-identified] : 4500 cGy [de-identified] : Pelvis

## 2021-05-19 NOTE — REVIEW OF SYSTEMS
[Patient Intake Form Reviewed] : Patient intake form was reviewed [Negative] : Allergic/Immunologic [Fatigue: Grade 1 - Fatigue relieved by rest] : Fatigue: Grade 1 - Fatigue relieved by rest [Hematuria: Grade 0] : Hematuria: Grade 0 [Urinary Tract Pain: Grade 1 - Mild pain] : Urinary Tract Pain: Grade 1 - Mild pain [Urinary Frequency: Grade 0] : Urinary Frequency: Grade 0 [Skin Hyperpigmentation: Grade 1 - Hyperpigmentation covering <10% BSA; no psychosocial impact] : Skin Hyperpigmentation: Grade 1 - Hyperpigmentation covering <10% BSA; no psychosocial impact [FreeTextEntry4] : slight burning - not related to urinating  [Abdominal Pain] : no abdominal pain [Vomiting] : no vomiting [Constipation] : no constipation [Diarrhea] : no diarrhea [de-identified] : s/p laparoscopic hysterectomy

## 2021-05-19 NOTE — PHYSICAL EXAM
[Sclera] : the sclera and conjunctiva were normal [Extraocular Movements] : extraocular movements were intact [Outer Ear] : the ears and nose were normal in appearance [Hearing Threshold Finger Rub Not [place]] : hearing was normal [] : no respiratory distress [Respiration, Rhythm And Depth] : normal respiratory rhythm and effort [Heart Rate And Rhythm] : heart rate and rhythm were normal [Abdomen Soft] : soft [Nondistended] : nondistended [Abdomen Tenderness] : non-tender [Normal] : oriented to person, place and time, the affect was normal, the mood was normal and not anxious [Auscultation Breath Sounds / Voice Sounds] : lungs were clear to auscultation bilaterally [Bowel Sounds] : normal bowel sounds [de-identified] : hyperpigmentation to pelvis/ groin area.

## 2021-05-19 NOTE — HISTORY OF PRESENT ILLNESS
[FreeTextEntry1] : 5/12/21- OTV- Ms. Robertson has completed 3240 cGy/ 4500 cGy to the pelvis.  Today, she notes she feels well. Continues to note intermittent mild burning sensation with urination, no worse than last week and not taking anything for it. Denies blood in the urine or stool. Denies diarrhea/ loose stools. Denies vaginal bleeding or discharge. Reports good appetite. Using Eucerin daily. She has noted hyperpigmentation to groin, but no skin irritation or desquamation.  She feels well.  will continue RT as planned.\par \par 5/4/21 OTV - Ms Robertson presents today for weekly OTV, ater completing 13 of 26 fractions for 2340 cGy of 4500 cGY. . Patient states she is starting to feel increased fatigue and needed to nap as of yesterday. She said that fatigue was able to be relieved by rest. Patient states she has some intermittent burning feeling but that is goes away on its own without treatment. Denies hematuria or vaginal bleeding or discharge.\par \par 4/28/21- OTV- Ms. Robertson has completed 1620 cGy/ 4500 cGy to the pelvis.  Today, she notes she feels well with the exception of gas at times. Also reports an episode of loose stool yesterday afternoon, but no further episodes today and no blood in the stool. Denies dysuria, vaginal bleeding or discharge. Reports good appetite and energy. Using Eucerin daily.\par \par 4/21/21- OTV- Ms. Robertson has completed 900 cGy/ 4500 cGy to the pelvis.  Today, she reports she feels good and treatment is going well thus far. She denies GI complaints to include abdominal pain or diarrhea. Denies dysuria, vaginal bleeding or discharge.\par \par ___________________\par INITIAL EVAL (3/25/21)\par Ms. Enedina Robertson is a 70 year old female who presents for consideration of radiation therapy for a FIGO stage 1B grade 1 endometrioid adenocarcinoma with LVSI and two pelvic nodes with isolated tumor cells. She is s/p hysterectomy and BSO on 3/2/21.\par \par Patient noted PMB, described as intermittent vaginal spotting for three weeks, as well as cramping. \par \par Pap done 1/20/21 was negative for intraepithelial lesion or malignancy, HPV not detected. \par \par Pelvic ultrasound done 1/21/21 revealed the following: \par -Thickened endometrium up to 1.6 cm. Intracavitary nodularity measures 1 x 0.8 x 2 cm.\par -Posterior intramural fibroid measures 0.8 x 0.5 x 0.8 cm. \par \par EMB done 2/4/21 showed the following: \par -Endometrioid adenocarcinoma, well differentiated, FIGO 1.\par \par CT Abdomen and Pelvis done 2/9/21 showed the following: \par -Right fundal submucosal/ endometrial lesion measuring 2.8 x 2 cm, unclear if fibroid or neoplasm.\par -Borderline enlarged indeterminate right obturator lymph node measuring 1.1 cm. No metastatic disease.\par \par On 3/2/21, she underwent robotic-assisted total laparoscopic hysterectomy, bilateral salpingo-oophorectomy, and sentinel lymph node mapping with pelvic and paraaortic lymph node biopsy. Pathology revealed the following: \par 1. Left pelvic sentinel lymph node: 0/1.\par 2. Right pelvic sentinel lymph node: One lymph node with isolated rare cytokeratin positive tumor cells.\par 3. Right obturator sentinel lymph node: One lymph node with isolated cytokeratin positive tumor cells.\par 4. Uterus, cervix, bilateral fallopian tubes and ovaries, hysterectomy and salpingo-oophorectomy: Well differentiated endometrioid adenocarcinoma, FIGO 1, infiltrating into the outer half of the myometrium (MELF type pattern of invasion). Depth of invasion 12 mm,\par Myometrial thickness 17 mm. Benign cervix. Benign ovaries, bilateral. Benign fallopian tubes, bilateral. Adenomyosis present, involved by carcinoma. LVSI identified. \par 5. Left sentinel para-aortic lymph node: 0/1. \par Pathologic Stage Classification pTNM, AJCC 8th Edition: pT1b, pN0 (i+)\par -Pelvic wash: Atypical findings.  Cytology slide shows rare clusters of mildly atypical cells with prominent nucleoli and vacuolated cytoplasm in a background of scattered lymphocytes, histiocytes and collagen balls. Reactive mesothelial cells cannot be ruled out.\par -IHC for MMR: No loss of nuclear expression of MMR proteins (MLH1, MSH2, MSH6, and PMS2).  These results show low probability\par of microsatellite instability-high (MSI-H).\par \par She followed up with Dr. Hein on 3/18/21, at which time they discussed gyn onc tumor board consensus for pelvic EBRT. \par \par Today, she notes she feels well physically although she is nervous about her diagnosis and treatment. She denies NVDC, abdominal pain, dysuria, vaginal bleeding or discharge, fever, chills, or any other complaints. \par She denies history of radiation.\par \par Denies family history of cancer, although her  is currently undergoing RT for prostate cancer. \par \par Patient is a non-smoker, drinks alcohol socially. She lives in [place] with her . She is a retired ICU nurse.

## 2021-05-19 NOTE — HISTORY OF PRESENT ILLNESS
[FreeTextEntry1] : 5/19/21- OTV- Ms. Robertson has completed 4140 cGy/ 4500 cGy to the pelvis.  Today, she states that she feels well overall. Some urinary pain/burning which happens intermittently, not related to urinating. She states her appetite is good, the diarrhea has resolved and she has had no more episodes of loose stools this past week. Continues to use Eucerin as instructed. Denies hematuria. Patient states she is looking forward to completing treatment this week, she has 2 fx remaining. \par \par 5/12/21- OTV- Ms. Robertson has completed 3240 cGy/ 4500 cGy to the pelvis.  Today, she notes she feels well. Continues to note intermittent mild burning sensation with urination, no worse than last week and not taking anything for it. Denies blood in the urine or stool. Denies diarrhea/ loose stools. Denies vaginal bleeding or discharge. Reports good appetite. Using Eucerin daily. She has noted hyperpigmentation to groin, but no skin irritation.\par \par 5/4/21 OTV - Ms Marcelo presents today for weekly OTV, ater completing 13 of 26 fractions for 2340 cGy of 4500 cGY. . Patient states she is starting to feel increased fatigue and needed to nap as of yesterday. She said that fatigue was able to be relieved by rest. Patient states she has some intermittent burning feeling but that is goes away on its own without treatment. Denies hematuria or vaginal bleeding or discharge.\par \par 4/28/21- OTV- Ms. Robertson has completed 1620 cGy/ 4500 cGy to the pelvis.  Today, she notes she feels well with the exception of gas at times. Also reports an episode of loose stool yesterday afternoon, but no further episodes today and no blood in the stool. Denies dysuria, vaginal bleeding or discharge. Reports good appetite and energy. Using Eucerin daily.\par \par 4/21/21- OTV- Ms. Marcelo has completed 900 cGy/ 4500 cGy to the pelvis.  Today, she reports she feels good and treatment is going well thus far. She denies GI complaints to include abdominal pain or diarrhea. Denies dysuria, vaginal bleeding or discharge.\par \par ___________________\par INITIAL EVAL (3/25/21)\par Ms. Enedina Robertson is a 70 year old female who presents for consideration of radiation therapy for a FIGO stage 1B grade 1 endometrioid adenocarcinoma with LVSI and two pelvic nodes with isolated tumor cells. She is s/p hysterectomy and BSO on 3/2/21.\par \par Patient noted PMB, described as intermittent vaginal spotting for three weeks, as well as cramping. \par \par Pap done 1/20/21 was negative for intraepithelial lesion or malignancy, HPV not detected. \par \par Pelvic ultrasound done 1/21/21 revealed the following: \par -Thickened endometrium up to 1.6 cm. Intracavitary nodularity measures 1 x 0.8 x 2 cm.\par -Posterior intramural fibroid measures 0.8 x 0.5 x 0.8 cm. \par \par EMB done 2/4/21 showed the following: \par -Endometrioid adenocarcinoma, well differentiated, FIGO 1.\par \par CT Abdomen and Pelvis done 2/9/21 showed the following: \par -Right fundal submucosal/ endometrial lesion measuring 2.8 x 2 cm, unclear if fibroid or neoplasm.\par -Borderline enlarged indeterminate right obturator lymph node measuring 1.1 cm. No metastatic disease.\par \par On 3/2/21, she underwent robotic-assisted total laparoscopic hysterectomy, bilateral salpingo-oophorectomy, and sentinel lymph node mapping with pelvic and paraaortic lymph node biopsy. Pathology revealed the following: \par 1. Left pelvic sentinel lymph node: 0/1.\par 2. Right pelvic sentinel lymph node: One lymph node with isolated rare cytokeratin positive tumor cells.\par 3. Right obturator sentinel lymph node: One lymph node with isolated cytokeratin positive tumor cells.\par 4. Uterus, cervix, bilateral fallopian tubes and ovaries, hysterectomy and salpingo-oophorectomy: Well differentiated endometrioid adenocarcinoma, FIGO 1, infiltrating into the outer half of the myometrium (MELF type pattern of invasion). Depth of invasion 12 mm,\par Myometrial thickness 17 mm. Benign cervix. Benign ovaries, bilateral. Benign fallopian tubes, bilateral. Adenomyosis present, involved by carcinoma. LVSI identified. \par 5. Left sentinel para-aortic lymph node: 0/1. \par Pathologic Stage Classification pTNM, AJCC 8th Edition: pT1b, pN0 (i+)\par -Pelvic wash: Atypical findings.  Cytology slide shows rare clusters of mildly atypical cells with prominent nucleoli and vacuolated cytoplasm in a background of scattered lymphocytes, histiocytes and collagen balls. Reactive mesothelial cells cannot be ruled out.\par -IHC for MMR: No loss of nuclear expression of MMR proteins (MLH1, MSH2, MSH6, and PMS2).  These results show low probability\par of microsatellite instability-high (MSI-H).\par \par She followed up with Dr. Hein on 3/18/21, at which time they discussed gyn onc tumor board consensus for pelvic EBRT. \par \par Today, she notes she feels well physically although she is nervous about her diagnosis and treatment. She denies NVDC, abdominal pain, dysuria, vaginal bleeding or discharge, fever, chills, or any other complaints. \par She denies history of radiation.\par \par Denies family history of cancer, although her  is currently undergoing RT for prostate cancer. \par \par Patient is a non-smoker, drinks alcohol socially. She lives in [place] with her . She is a retired ICU nurse.

## 2021-06-30 ENCOUNTER — APPOINTMENT (OUTPATIENT)
Dept: RADIATION ONCOLOGY | Facility: CLINIC | Age: 71
End: 2021-06-30
Payer: MEDICARE

## 2021-06-30 VITALS
WEIGHT: 143 LBS | RESPIRATION RATE: 18 BRPM | HEART RATE: 76 BPM | OXYGEN SATURATION: 97 % | BODY MASS INDEX: 26.16 KG/M2 | TEMPERATURE: 98.1 F | DIASTOLIC BLOOD PRESSURE: 75 MMHG | SYSTOLIC BLOOD PRESSURE: 130 MMHG

## 2021-06-30 PROCEDURE — 99024 POSTOP FOLLOW-UP VISIT: CPT

## 2021-06-30 NOTE — REVIEW OF SYSTEMS
Large Joint Aspiration/Injection: L glenohumeral    Date/Time: 12/30/2020 1:00 PM  Performed by: Prasanth Johnson MD  Authorized by: Prasanth Johnson MD     Consent Done?:  Yes (Verbal)  Indications:  Pain  Site marked: the procedure site was marked    Timeout: prior to procedure the correct patient, procedure, and site was verified    Prep: patient was prepped and draped in usual sterile fashion    Local anesthetic:  Lidocaine 2% without epinephrine    Details:  Needle Size:  25 G  Ultrasonic Guidance for needle placement?: No    Location:  Shoulder  Site:  L glenohumeral  Medications:  40 mg triamcinolone acetonide 40 mg/mL  Patient tolerance:  Patient tolerated the procedure well with no immediate complications       [Patient Intake Form Reviewed] : Patient intake form was reviewed [Negative] : Allergic/Immunologic [Abdominal Pain] : no abdominal pain [Vomiting] : no vomiting [Constipation] : no constipation [Diarrhea] : no diarrhea [de-identified] : s/p laparoscopic hysterectomy

## 2021-06-30 NOTE — PHYSICAL EXAM
[Sclera] : the sclera and conjunctiva were normal [Extraocular Movements] : extraocular movements were intact [Outer Ear] : the ears and nose were normal in appearance [Hearing Threshold Finger Rub Not [place]] : hearing was normal [] : no respiratory distress [Respiration, Rhythm And Depth] : normal respiratory rhythm and effort [Auscultation Breath Sounds / Voice Sounds] : lungs were clear to auscultation bilaterally [Heart Rate And Rhythm] : heart rate and rhythm were normal [Heart Sounds] : normal S1 and S2 [Bowel Sounds] : normal bowel sounds [Abdomen Soft] : soft [Nondistended] : nondistended [Abdomen Tenderness] : non-tender [Normal] : oriented to person, place and time, the affect was normal, the mood was normal and not anxious [de-identified] : d [de-identified] : well healed laparoscopy incisions.

## 2021-06-30 NOTE — HISTORY OF PRESENT ILLNESS
[FreeTextEntry1] : Ms. Enedina Robertson is a 70 year old female with history of a FIGO stage 1B grade 1 endometrioid adenocarcinoma with LVSI and two pelvic nodes with isolated tumor cells. She is s/p hysterectomy and BSO on 3/2/21. She completed radiation therapy to the pelvis for a total of 4500 cGy over 25 fractions from 4/15/21 to 5/21/21. She tolerated her radiation well and did not develop any grade 3 or higher acute toxicities as a result of treatment. \par \par 6/30/21- PTE\par Ms. Robertson returns for post treatment evaluation. She is scheduled to follow up with Dr. Hein in August. Today, she notes she feels well, and denies any complaints to include diarrhea, constipation, abdominal pain, dysuria, hematuria, vaginal pain, bleeding, or abnormal discharge. \par \par __________________\par INITIAL EVAL (3/25/21)\par Ms. Enedina Robertson is a 70 year old female who presents for consideration of radiation therapy for a FIGO stage 1B grade 1 endometrioid adenocarcinoma with LVSI and two pelvic nodes with isolated tumor cells. She is s/p hysterectomy and BSO on 3/2/21.\par \par Patient noted PMB, described as intermittent vaginal spotting for three weeks, as well as cramping. \par \par Pap done 1/20/21 was negative for intraepithelial lesion or malignancy, HPV not detected. \par \par Pelvic ultrasound done 1/21/21 revealed the following: \par -Thickened endometrium up to 1.6 cm. Intracavitary nodularity measures 1 x 0.8 x 2 cm.\par -Posterior intramural fibroid measures 0.8 x 0.5 x 0.8 cm. \par \par EMB done 2/4/21 showed the following: \par -Endometrioid adenocarcinoma, well differentiated, FIGO 1.\par \par CT Abdomen and Pelvis done 2/9/21 showed the following: \par -Right fundal submucosal/ endometrial lesion measuring 2.8 x 2 cm, unclear if fibroid or neoplasm.\par -Borderline enlarged indeterminate right obturator lymph node measuring 1.1 cm. No metastatic disease.\par \par On 3/2/21, she underwent robotic-assisted total laparoscopic hysterectomy, bilateral salpingo-oophorectomy, and sentinel lymph node mapping with pelvic and paraaortic lymph node biopsy. Pathology revealed the following: \par 1. Left pelvic sentinel lymph node: 0/1.\par 2. Right pelvic sentinel lymph node: One lymph node with isolated rare cytokeratin positive tumor cells.\par 3. Right obturator sentinel lymph node: One lymph node with isolated cytokeratin positive tumor cells.\par 4. Uterus, cervix, bilateral fallopian tubes and ovaries, hysterectomy and salpingo-oophorectomy: Well differentiated endometrioid adenocarcinoma, FIGO 1, infiltrating into the outer half of the myometrium (MELF type pattern of invasion). Depth of invasion 12 mm,\par Myometrial thickness 17 mm. Benign cervix. Benign ovaries, bilateral. Benign fallopian tubes, bilateral. Adenomyosis present, involved by carcinoma. LVSI identified. \par 5. Left sentinel para-aortic lymph node: 0/1. \par Pathologic Stage Classification pTNM, AJCC 8th Edition: pT1b, pN0 (i+)\par -Pelvic wash: Atypical findings.  Cytology slide shows rare clusters of mildly atypical cells with prominent nucleoli and vacuolated cytoplasm in a background of scattered lymphocytes, histiocytes and collagen balls. Reactive mesothelial cells cannot be ruled out.\par -IHC for MMR: No loss of nuclear expression of MMR proteins (MLH1, MSH2, MSH6, and PMS2).  These results show low probability\par of microsatellite instability-high (MSI-H).\par \par She followed up with Dr. Hein on 3/18/21, at which time they discussed gyn onc tumor board consensus for pelvic EBRT. \par \par Today, she notes she feels well physically although she is nervous about her diagnosis and treatment. She denies NVDC, abdominal pain, dysuria, vaginal bleeding or discharge, fever, chills, or any other complaints. \par She denies history of radiation.\par \par Denies family history of cancer, although her  is currently undergoing RT for prostate cancer. \par \par Patient is a non-smoker, drinks alcohol socially. She lives in [place] with her . She is a retired ICU nurse.

## 2021-07-27 ENCOUNTER — FORM ENCOUNTER (OUTPATIENT)
Age: 71
End: 2021-07-27

## 2021-08-10 ENCOUNTER — NON-APPOINTMENT (OUTPATIENT)
Age: 71
End: 2021-08-10

## 2021-08-19 ENCOUNTER — APPOINTMENT (OUTPATIENT)
Dept: GYNECOLOGIC ONCOLOGY | Facility: CLINIC | Age: 71
End: 2021-08-19
Payer: MEDICARE

## 2021-08-19 VITALS
HEART RATE: 68 BPM | DIASTOLIC BLOOD PRESSURE: 73 MMHG | OXYGEN SATURATION: 96 % | SYSTOLIC BLOOD PRESSURE: 157 MMHG | HEIGHT: 62 IN | WEIGHT: 143.5 LBS | BODY MASS INDEX: 26.41 KG/M2

## 2021-08-19 PROCEDURE — 99214 OFFICE O/P EST MOD 30 MIN: CPT

## 2021-11-10 ENCOUNTER — APPOINTMENT (OUTPATIENT)
Dept: RADIATION ONCOLOGY | Facility: CLINIC | Age: 71
End: 2021-11-10
Payer: MEDICARE

## 2021-11-10 VITALS
SYSTOLIC BLOOD PRESSURE: 132 MMHG | WEIGHT: 140 LBS | BODY MASS INDEX: 25.76 KG/M2 | OXYGEN SATURATION: 98 % | HEART RATE: 68 BPM | HEIGHT: 62 IN | DIASTOLIC BLOOD PRESSURE: 74 MMHG | TEMPERATURE: 98.4 F

## 2021-11-10 PROCEDURE — 99213 OFFICE O/P EST LOW 20 MIN: CPT

## 2021-11-10 NOTE — PHYSICAL EXAM
[Normal] : normal external genitalia [Normal] : normal spine exam without palpable tenderness, no kyphosis or scoliosis [de-identified] : pelvic examination with well healed vaginal cuff; no telangiectasias or sign of recurrent disease.

## 2021-11-10 NOTE — HISTORY OF PRESENT ILLNESS
[FreeTextEntry1] : Ms. Enedina Robertson is a 70 year old female with history of a FIGO stage 1B grade 1 endometrioid adenocarcinoma with LVSI and two pelvic nodes with isolated tumor cells. She is s/p hysterectomy and BSO on 3/2/21. She completed radiation therapy to the pelvis for a total of 4500 cGy over 25 fractions from 4/15/21 to 5/21/21. She tolerated her radiation well and did not develop any grade 3 or higher acute toxicities as a result of treatment. \par \par 11/10/2021- RPA-Today she notes that she feeling well and presents for follow-up. Specifically, she notes baseline urine function and denies dysuria and hematuria. She denies vaginal pain, bleeding, abnormal discharge and irritation. She denies GI complaints, including diarrhea, constipation,rectal pain and rectal bleeding. Pt educated on the importance of using the vaginal dilator 2-3 times a week and states she is using a size #1 dilator as directed, pt given a size #2 dilator.  She followed up with Dr. Hein on 8/19/21 and was ARTEMIO on physical examination, with next appt scheduled in 2/2022. GYN exam performed and pt well healed. Pt c/o occasional and incidental arthritic hip pain when walking long distances but feels well.  \par \par \par 6/30/21- PTE\par Ms. Robertson returns for post treatment evaluation. She is scheduled to follow up with Dr. Hein in August. Today, she notes she feels well, and denies any complaints to include diarrhea, constipation, abdominal pain, dysuria, hematuria, vaginal pain, bleeding, or abnormal discharge. \par \par __________________\par INITIAL EVAL (3/25/21)\par Ms. Enedina Robertson is a 70 year old female who presents for consideration of radiation therapy for a FIGO stage 1B grade 1 endometrioid adenocarcinoma with LVSI and two pelvic nodes with isolated tumor cells. She is s/p hysterectomy and BSO on 3/2/21.\par \par Patient noted PMB, described as intermittent vaginal spotting for three weeks, as well as cramping. \par \par Pap done 1/20/21 was negative for intraepithelial lesion or malignancy, HPV not detected. \par \par Pelvic ultrasound done 1/21/21 revealed the following: \par -Thickened endometrium up to 1.6 cm. Intracavitary nodularity measures 1 x 0.8 x 2 cm.\par -Posterior intramural fibroid measures 0.8 x 0.5 x 0.8 cm. \par \par EMB done 2/4/21 showed the following: \par -Endometrioid adenocarcinoma, well differentiated, FIGO 1.\par \par CT Abdomen and Pelvis done 2/9/21 showed the following: \par -Right fundal submucosal/ endometrial lesion measuring 2.8 x 2 cm, unclear if fibroid or neoplasm.\par -Borderline enlarged indeterminate right obturator lymph node measuring 1.1 cm. No metastatic disease.\par \par On 3/2/21, she underwent robotic-assisted total laparoscopic hysterectomy, bilateral salpingo-oophorectomy, and sentinel lymph node mapping with pelvic and paraaortic lymph node biopsy. Pathology revealed the following: \par 1. Left pelvic sentinel lymph node: 0/1.\par 2. Right pelvic sentinel lymph node: One lymph node with isolated rare cytokeratin positive tumor cells.\par 3. Right obturator sentinel lymph node: One lymph node with isolated cytokeratin positive tumor cells.\par 4. Uterus, cervix, bilateral fallopian tubes and ovaries, hysterectomy and salpingo-oophorectomy: Well differentiated endometrioid adenocarcinoma, FIGO 1, infiltrating into the outer half of the myometrium (MELF type pattern of invasion). Depth of invasion 12 mm,\par Myometrial thickness 17 mm. Benign cervix. Benign ovaries, bilateral. Benign fallopian tubes, bilateral. Adenomyosis present, involved by carcinoma. LVSI identified. \par 5. Left sentinel para-aortic lymph node: 0/1. \par Pathologic Stage Classification pTNM, AJCC 8th Edition: pT1b, pN0 (i+)\par -Pelvic wash: Atypical findings.  Cytology slide shows rare clusters of mildly atypical cells with prominent nucleoli and vacuolated cytoplasm in a background of scattered lymphocytes, histiocytes and collagen balls. Reactive mesothelial cells cannot be ruled out.\par -IHC for MMR: No loss of nuclear expression of MMR proteins (MLH1, MSH2, MSH6, and PMS2).  These results show low probability\par of microsatellite instability-high (MSI-H).\par \par She followed up with Dr. Hein on 3/18/21, at which time they discussed gyn onc tumor board consensus for pelvic EBRT. \par \par Today, she notes she feels well physically although she is nervous about her diagnosis and treatment. She denies NVDC, abdominal pain, dysuria, vaginal bleeding or discharge, fever, chills, or any other complaints. \par She denies history of radiation.\par \par Denies family history of cancer, although her  is currently undergoing RT for prostate cancer. \par \par Patient is a non-smoker, drinks alcohol socially. She lives in [place] with her . She is a retired ICU nurse.

## 2022-02-07 ENCOUNTER — APPOINTMENT (OUTPATIENT)
Dept: GYNECOLOGIC ONCOLOGY | Facility: CLINIC | Age: 72
End: 2022-02-07
Payer: MEDICARE

## 2022-02-07 VITALS
HEART RATE: 68 BPM | HEIGHT: 62 IN | BODY MASS INDEX: 25.76 KG/M2 | SYSTOLIC BLOOD PRESSURE: 128 MMHG | WEIGHT: 140 LBS | DIASTOLIC BLOOD PRESSURE: 71 MMHG

## 2022-02-07 PROCEDURE — 99214 OFFICE O/P EST MOD 30 MIN: CPT

## 2022-02-16 ENCOUNTER — OUTPATIENT (OUTPATIENT)
Dept: OUTPATIENT SERVICES | Facility: HOSPITAL | Age: 72
LOS: 1 days | End: 2022-02-16
Payer: MEDICARE

## 2022-02-16 ENCOUNTER — APPOINTMENT (OUTPATIENT)
Dept: CT IMAGING | Facility: HOSPITAL | Age: 72
End: 2022-02-16
Payer: MEDICARE

## 2022-02-16 ENCOUNTER — RESULT REVIEW (OUTPATIENT)
Age: 72
End: 2022-02-16

## 2022-02-16 DIAGNOSIS — N63.10 UNSPECIFIED LUMP IN THE RIGHT BREAST, UNSPECIFIED QUADRANT: Chronic | ICD-10-CM

## 2022-02-16 PROCEDURE — 82565 ASSAY OF CREATININE: CPT

## 2022-02-16 PROCEDURE — 74177 CT ABD & PELVIS W/CONTRAST: CPT | Mod: MD

## 2022-02-16 PROCEDURE — 74177 CT ABD & PELVIS W/CONTRAST: CPT | Mod: 26,MD

## 2022-05-16 NOTE — HISTORY OF PRESENT ILLNESS
[FreeTextEntry1] : Ms. Enedina Robertson is a 70 year old female with history of a FIGO stage 1B grade 1 endometrioid adenocarcinoma with LVSI and two pelvic nodes with isolated tumor cells. She is s/p hysterectomy and BSO on 3/2/21. She completed radiation therapy to the pelvis for a total of 4500 cGy over 25 fractions from 4/15/21 to 5/21/21. She tolerated her radiation well and did not develop any grade 3 or higher acute toxicities as a result of treatment. \par \par \par 05/18/2022- RPA-Today she notes that she ___ feeling well and presents for follow-up. Specifically, she notes baseline urine function and denies ____ dysuria and hematuria. She denies vaginal pain, bleeding, abnormal discharge and irritation. She denies_____ GI complaints, including diarrhea, constipation,rectal pain and rectal bleeding. Pt states  using the vaginal dilator 2-3 times a week  in times of no sexual activity and sample dilators given to patient. She followed up with Dr. Hein on 2/7/22 and on 2/16/22-CT Abd/Pelvis done with no local recurrent or metastatic disease. Next f/u with Dr. Hein scheduled in 8/7/22. \par \par \par \par 6/30/21- PTE\par Ms. Robertson returns for post treatment evaluation. She is scheduled to follow up with Dr. Hein in August. Today, she notes she feels well, and denies any complaints to include diarrhea, constipation, abdominal pain, dysuria, hematuria, vaginal pain, bleeding, or abnormal discharge. \par \par __________________\par INITIAL EVAL (3/25/21)\par Ms. Enedina Robertson is a 70 year old female who presents for consideration of radiation therapy for a FIGO stage 1B grade 1 endometrioid adenocarcinoma with LVSI and two pelvic nodes with isolated tumor cells. She is s/p hysterectomy and BSO on 3/2/21.\par \par Patient noted PMB, described as intermittent vaginal spotting for three weeks, as well as cramping. \par \par Pap done 1/20/21 was negative for intraepithelial lesion or malignancy, HPV not detected. \par \par Pelvic ultrasound done 1/21/21 revealed the following: \par -Thickened endometrium up to 1.6 cm. Intracavitary nodularity measures 1 x 0.8 x 2 cm.\par -Posterior intramural fibroid measures 0.8 x 0.5 x 0.8 cm. \par \par EMB done 2/4/21 showed the following: \par -Endometrioid adenocarcinoma, well differentiated, FIGO 1.\par \par CT Abdomen and Pelvis done 2/9/21 showed the following: \par -Right fundal submucosal/ endometrial lesion measuring 2.8 x 2 cm, unclear if fibroid or neoplasm.\par -Borderline enlarged indeterminate right obturator lymph node measuring 1.1 cm. No metastatic disease.\par \par On 3/2/21, she underwent robotic-assisted total laparoscopic hysterectomy, bilateral salpingo-oophorectomy, and sentinel lymph node mapping with pelvic and paraaortic lymph node biopsy. Pathology revealed the following: \par 1. Left pelvic sentinel lymph node: 0/1.\par 2. Right pelvic sentinel lymph node: One lymph node with isolated rare cytokeratin positive tumor cells.\par 3. Right obturator sentinel lymph node: One lymph node with isolated cytokeratin positive tumor cells.\par 4. Uterus, cervix, bilateral fallopian tubes and ovaries, hysterectomy and salpingo-oophorectomy: Well differentiated endometrioid adenocarcinoma, FIGO 1, infiltrating into the outer half of the myometrium (MELF type pattern of invasion). Depth of invasion 12 mm,\par Myometrial thickness 17 mm. Benign cervix. Benign ovaries, bilateral. Benign fallopian tubes, bilateral. Adenomyosis present, involved by carcinoma. LVSI identified. \par 5. Left sentinel para-aortic lymph node: 0/1. \par Pathologic Stage Classification pTNM, AJCC 8th Edition: pT1b, pN0 (i+)\par -Pelvic wash: Atypical findings.  Cytology slide shows rare clusters of mildly atypical cells with prominent nucleoli and vacuolated cytoplasm in a background of scattered lymphocytes, histiocytes and collagen balls. Reactive mesothelial cells cannot be ruled out.\par -IHC for MMR: No loss of nuclear expression of MMR proteins (MLH1, MSH2, MSH6, and PMS2).  These results show low probability\par of microsatellite instability-high (MSI-H).\par \par She followed up with Dr. Hein on 3/18/21, at which time they discussed gyn onc tumor board consensus for pelvic EBRT. \par \par Today, she notes she feels well physically although she is nervous about her diagnosis and treatment. She denies NVDC, abdominal pain, dysuria, vaginal bleeding or discharge, fever, chills, or any other complaints. \par She denies history of radiation.\par \par Denies family history of cancer, although her  is currently undergoing RT for prostate cancer. \par \par Patient is a non-smoker, drinks alcohol socially. She lives in [place] with her . She is a retired ICU nurse.

## 2022-05-18 ENCOUNTER — APPOINTMENT (OUTPATIENT)
Dept: RADIATION ONCOLOGY | Facility: CLINIC | Age: 72
End: 2022-05-18

## 2022-05-20 ENCOUNTER — APPOINTMENT (OUTPATIENT)
Dept: RADIATION ONCOLOGY | Facility: CLINIC | Age: 72
End: 2022-05-20

## 2023-03-07 ENCOUNTER — APPOINTMENT (OUTPATIENT)
Dept: RADIATION ONCOLOGY | Facility: CLINIC | Age: 73
End: 2023-03-07
Payer: MEDICARE

## 2023-03-07 VITALS
HEART RATE: 94 BPM | RESPIRATION RATE: 17 BRPM | DIASTOLIC BLOOD PRESSURE: 91 MMHG | SYSTOLIC BLOOD PRESSURE: 153 MMHG | BODY MASS INDEX: 26.13 KG/M2 | HEIGHT: 62 IN | OXYGEN SATURATION: 99 % | TEMPERATURE: 98.7 F | WEIGHT: 142 LBS

## 2023-03-07 PROCEDURE — 99213 OFFICE O/P EST LOW 20 MIN: CPT

## 2023-03-07 NOTE — HISTORY OF PRESENT ILLNESS
[FreeTextEntry1] : Ms. Enedina Robertson is a 70 year old female with history of a FIGO stage 1B grade 1 endometrioid adenocarcinoma with LVSI and two pelvic nodes with isolated tumor cells. She is s/p hysterectomy and BSO on 3/2/21. She completed radiation therapy to the pelvis for a total of 4500 cGy over 25 fractions from 4/15/21 to 5/21/21. She tolerated her radiation well and did not develop any grade 3 or higher acute toxicities as a result of treatment. \par \par \par GYN ONC; Dr. Hein appt 3/17/2023\par CT abd/pelvis pending for 2023\par \par 3/7/2023- Follow up- She completed radiation therapy to the pelvis for a total of 4500 cGy over 25 fractions from 4/15/21 to 5/21/21 with Dr. Son.  New Ct abd/ Pelvis pending. Last done 2/16/22 resulting in no locally recurrent or metastatic disease.  Today the patient presents generally well with no complaints of fever, chills, sob, chest pain, n/v, diarrhea or constipation.  She reports some stress incontinence that murray snot affect her daily living, otherwise no urgency, dysuria, hematuria, vaginal bleeding or pain. \par \par \par 2/16/2022 CT abd/pelvis:\par LOWER CHEST: Within normal limits.\par \par LIVER: Within normal limits.\par BILE DUCTS: Normal caliber.\par GALLBLADDER: Within normal limits.\par SPLEEN: Within normal limits.\par PANCREAS: Within normal limits.\par ADRENALS: Within normal limits.\par KIDNEYS/URETERS: Within normal limits.\par \par BLADDER: Within normal limits.\par REPRODUCTIVE ORGANS: Hysterectomy. No local recurrence.\par \par BOWEL: No bowel obstruction. Appendix is normal.\par PERITONEUM: No ascites.\par VESSELS: Within normal limits.\par RETROPERITONEUM/LYMPH NODES: No lymphadenopathy.\par ABDOMINAL WALL: Within normal limits.\par BONES: Within normal limits.\par \par IMPRESSION:\par No locally recurrent or metastatic disease.\par \par \par \par \par \par 6/30/21- PTE\par Ms. Robertson returns for post treatment evaluation. She is scheduled to follow up with Dr. Hein in August. Today, she notes she feels well, and denies any complaints to include diarrhea, constipation, abdominal pain, dysuria, hematuria, vaginal pain, bleeding, or abnormal discharge. \par \par __________________\par INITIAL EVAL (3/25/21)\par Ms. Enedina Robertson is a 70 year old female who presents for consideration of radiation therapy for a FIGO stage 1B grade 1 endometrioid adenocarcinoma with LVSI and two pelvic nodes with isolated tumor cells. She is s/p hysterectomy and BSO on 3/2/21.\par \par Patient noted PMB, described as intermittent vaginal spotting for three weeks, as well as cramping. \par \par Pap done 1/20/21 was negative for intraepithelial lesion or malignancy, HPV not detected. \par \par Pelvic ultrasound done 1/21/21 revealed the following: \par -Thickened endometrium up to 1.6 cm. Intracavitary nodularity measures 1 x 0.8 x 2 cm.\par -Posterior intramural fibroid measures 0.8 x 0.5 x 0.8 cm. \par \par EMB done 2/4/21 showed the following: \par -Endometrioid adenocarcinoma, well differentiated, FIGO 1.\par \par CT Abdomen and Pelvis done 2/9/21 showed the following: \par -Right fundal submucosal/ endometrial lesion measuring 2.8 x 2 cm, unclear if fibroid or neoplasm.\par -Borderline enlarged indeterminate right obturator lymph node measuring 1.1 cm. No metastatic disease.\par \par On 3/2/21, she underwent robotic-assisted total laparoscopic hysterectomy, bilateral salpingo-oophorectomy, and sentinel lymph node mapping with pelvic and paraaortic lymph node biopsy. Pathology revealed the following: \par 1. Left pelvic sentinel lymph node: 0/1.\par 2. Right pelvic sentinel lymph node: One lymph node with isolated rare cytokeratin positive tumor cells.\par 3. Right obturator sentinel lymph node: One lymph node with isolated cytokeratin positive tumor cells.\par 4. Uterus, cervix, bilateral fallopian tubes and ovaries, hysterectomy and salpingo-oophorectomy: Well differentiated endometrioid adenocarcinoma, FIGO 1, infiltrating into the outer half of the myometrium (MELF type pattern of invasion). Depth of invasion 12 mm,\par Myometrial thickness 17 mm. Benign cervix. Benign ovaries, bilateral. Benign fallopian tubes, bilateral. Adenomyosis present, involved by carcinoma. LVSI identified. \par 5. Left sentinel para-aortic lymph node: 0/1. \par Pathologic Stage Classification pTNM, AJCC 8th Edition: pT1b, pN0 (i+)\par -Pelvic wash: Atypical findings.  Cytology slide shows rare clusters of mildly atypical cells with prominent nucleoli and vacuolated cytoplasm in a background of scattered lymphocytes, histiocytes and collagen balls. Reactive mesothelial cells cannot be ruled out.\par -IHC for MMR: No loss of nuclear expression of MMR proteins (MLH1, MSH2, MSH6, and PMS2).  These results show low probability\par of microsatellite instability-high (MSI-H).\par \par She followed up with Dr. Hein on 3/18/21, at which time they discussed gyn onc tumor board consensus for pelvic EBRT. \par \par Today, she notes she feels well physically although she is nervous about her diagnosis and treatment. She denies NVDC, abdominal pain, dysuria, vaginal bleeding or discharge, fever, chills, or any other complaints. \par She denies history of radiation.\par \par Denies family history of cancer, although her  is currently undergoing RT for prostate cancer. \par \par Patient is a non-smoker, drinks alcohol socially. She lives in [place] with her . She is a retired ICU nurse.

## 2023-03-09 ENCOUNTER — NON-APPOINTMENT (OUTPATIENT)
Age: 73
End: 2023-03-09

## 2023-03-17 ENCOUNTER — APPOINTMENT (OUTPATIENT)
Dept: GYNECOLOGIC ONCOLOGY | Facility: CLINIC | Age: 73
End: 2023-03-17
Payer: MEDICARE

## 2023-03-17 VITALS
WEIGHT: 140 LBS | BODY MASS INDEX: 25.76 KG/M2 | HEIGHT: 62 IN | SYSTOLIC BLOOD PRESSURE: 165 MMHG | HEART RATE: 99 BPM | DIASTOLIC BLOOD PRESSURE: 81 MMHG

## 2023-03-17 PROCEDURE — 99214 OFFICE O/P EST MOD 30 MIN: CPT

## 2023-03-17 NOTE — DISCUSSION/SUMMARY
[FreeTextEntry1] : Signs and symptoms of recurrence reviewed in detail, namely vaginal bleeding, abdominal pain, changes in bowel habits or urination, nausea/vomiting.\par F/u in 6 months or sooner prn\par CT to evaluate for recurrent disease\par Discussed vaginal pap smear not indicated as per SGO and ASCCP guidelines\par

## 2023-03-17 NOTE — HISTORY OF PRESENT ILLNESS
[FreeTextEntry1] : Stage IBG1 endometrial adenocarcinoma with LVSI and two pelvic nodes with ITC\par robotic TLH/BSOSLN, pelvic and para-aortic LND, 3/2/21\par Completed WPRT 4500 cGy from 4/15/21-5/21/21\par Rad Onc: Dr. Wernicke\par GYN: Dr. De La Cruz\par \par Last seen 2/2022\par \par No complaints.  Denies abdominal/pelvic pain, dyspnea or chest pain, vaginal bleeding or discharge, nausea/vomiting, changes in bowel habits or urination, lower extremity edema or pain.\par She recently saw Dr. Wernicke for follow up.\par \par

## 2023-03-29 ENCOUNTER — OUTPATIENT (OUTPATIENT)
Dept: OUTPATIENT SERVICES | Facility: HOSPITAL | Age: 73
LOS: 1 days | End: 2023-03-29
Payer: MEDICARE

## 2023-03-29 ENCOUNTER — RESULT REVIEW (OUTPATIENT)
Age: 73
End: 2023-03-29

## 2023-03-29 ENCOUNTER — APPOINTMENT (OUTPATIENT)
Dept: CT IMAGING | Facility: HOSPITAL | Age: 73
End: 2023-03-29
Payer: MEDICARE

## 2023-03-29 ENCOUNTER — APPOINTMENT (OUTPATIENT)
Dept: MAMMOGRAPHY | Facility: HOSPITAL | Age: 73
End: 2023-03-29
Payer: MEDICARE

## 2023-03-29 DIAGNOSIS — N63.10 UNSPECIFIED LUMP IN THE RIGHT BREAST, UNSPECIFIED QUADRANT: Chronic | ICD-10-CM

## 2023-03-29 PROCEDURE — 74177 CT ABD & PELVIS W/CONTRAST: CPT | Mod: 26,MH

## 2023-03-29 PROCEDURE — 77067 SCR MAMMO BI INCL CAD: CPT | Mod: 26

## 2023-03-29 PROCEDURE — 77063 BREAST TOMOSYNTHESIS BI: CPT | Mod: 26

## 2023-03-29 PROCEDURE — 77067 SCR MAMMO BI INCL CAD: CPT

## 2023-03-29 PROCEDURE — 77063 BREAST TOMOSYNTHESIS BI: CPT

## 2023-03-29 PROCEDURE — 74177 CT ABD & PELVIS W/CONTRAST: CPT

## 2023-03-29 PROCEDURE — 82565 ASSAY OF CREATININE: CPT

## 2023-04-03 DIAGNOSIS — C54.1 MALIGNANT NEOPLASM OF ENDOMETRIUM: ICD-10-CM

## 2023-04-03 DIAGNOSIS — Z90.710 ACQUIRED ABSENCE OF BOTH CERVIX AND UTERUS: ICD-10-CM

## 2023-04-03 DIAGNOSIS — K42.9 UMBILICAL HERNIA WITHOUT OBSTRUCTION OR GANGRENE: ICD-10-CM

## 2023-04-03 DIAGNOSIS — Z90.722 ACQUIRED ABSENCE OF OVARIES, BILATERAL: ICD-10-CM

## 2023-04-03 DIAGNOSIS — Z12.31 ENCOUNTER FOR SCREENING MAMMOGRAM FOR MALIGNANT NEOPLASM OF BREAST: ICD-10-CM

## 2023-04-03 DIAGNOSIS — R92.1 MAMMOGRAPHIC CALCIFICATION FOUND ON DIAGNOSTIC IMAGING OF BREAST: ICD-10-CM

## 2023-04-03 DIAGNOSIS — N64.89 OTHER SPECIFIED DISORDERS OF BREAST: ICD-10-CM

## 2023-07-06 NOTE — H&P PST ADULT - RS GEN PE MLT RESP DETAILS PC
- - - airway patent/breath sounds equal/good air movement/respirations non-labored/clear to auscultation bilaterally

## 2024-03-13 ENCOUNTER — APPOINTMENT (OUTPATIENT)
Dept: GYNECOLOGIC ONCOLOGY | Facility: CLINIC | Age: 74
End: 2024-03-13
Payer: MEDICARE

## 2024-03-13 VITALS
SYSTOLIC BLOOD PRESSURE: 162 MMHG | WEIGHT: 140 LBS | HEIGHT: 62 IN | HEART RATE: 82 BPM | DIASTOLIC BLOOD PRESSURE: 77 MMHG | BODY MASS INDEX: 25.76 KG/M2

## 2024-03-13 DIAGNOSIS — C54.1 MALIGNANT NEOPLASM OF ENDOMETRIUM: ICD-10-CM

## 2024-03-13 PROCEDURE — 99214 OFFICE O/P EST MOD 30 MIN: CPT

## 2024-03-13 NOTE — HISTORY OF PRESENT ILLNESS
[FreeTextEntry1] : Stage IBG1 endometrial adenocarcinoma with LVSI and two pelvic nodes with ITC robotic TLH/BSOSLN, pelvic and para-aortic LND, 3/2/21 Completed WPRT 4500 cGy from 4/15/21-5/21/21 Rad Onc: Dr. Wernicke GYN: Dr. De La Cruz  Last seen 3/2023  No complaints.  Denies abdominal/pelvic pain, dyspnea or chest pain, vaginal bleeding or discharge, nausea/vomiting, changes in bowel habits or urination, lower extremity edema or pain.  CT A/P 3/2023 ARTEMIO

## 2024-04-01 ENCOUNTER — APPOINTMENT (OUTPATIENT)
Dept: GYNECOLOGIC ONCOLOGY | Facility: CLINIC | Age: 74
End: 2024-04-01

## 2025-03-31 ENCOUNTER — NON-APPOINTMENT (OUTPATIENT)
Age: 75
End: 2025-03-31

## 2025-03-31 ENCOUNTER — APPOINTMENT (OUTPATIENT)
Dept: GYNECOLOGIC ONCOLOGY | Facility: CLINIC | Age: 75
End: 2025-03-31
Payer: MEDICARE

## 2025-03-31 VITALS
HEART RATE: 81 BPM | HEIGHT: 62 IN | RESPIRATION RATE: 16 BRPM | SYSTOLIC BLOOD PRESSURE: 146 MMHG | DIASTOLIC BLOOD PRESSURE: 84 MMHG | OXYGEN SATURATION: 97 % | TEMPERATURE: 97.5 F | WEIGHT: 139 LBS | BODY MASS INDEX: 25.58 KG/M2

## 2025-03-31 DIAGNOSIS — C54.1 MALIGNANT NEOPLASM OF ENDOMETRIUM: ICD-10-CM

## 2025-03-31 PROCEDURE — 99214 OFFICE O/P EST MOD 30 MIN: CPT

## 2025-03-31 PROCEDURE — 99459 PELVIC EXAMINATION: CPT
